# Patient Record
Sex: FEMALE | Race: WHITE | NOT HISPANIC OR LATINO | Employment: STUDENT | URBAN - METROPOLITAN AREA
[De-identification: names, ages, dates, MRNs, and addresses within clinical notes are randomized per-mention and may not be internally consistent; named-entity substitution may affect disease eponyms.]

---
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
## 2023-02-03 ENCOUNTER — OFFICE VISIT (OUTPATIENT)
Dept: FAMILY MEDICINE CLINIC | Facility: CLINIC | Age: 18
End: 2023-02-03
Payer: COMMERCIAL

## 2023-02-03 VITALS
WEIGHT: 115 LBS | OXYGEN SATURATION: 99 % | SYSTOLIC BLOOD PRESSURE: 102 MMHG | DIASTOLIC BLOOD PRESSURE: 68 MMHG | HEIGHT: 66 IN | BODY MASS INDEX: 18.48 KG/M2 | HEART RATE: 86 BPM

## 2023-02-03 DIAGNOSIS — R11.2 NAUSEA AND VOMITING, UNSPECIFIED VOMITING TYPE: Primary | ICD-10-CM

## 2023-02-03 DIAGNOSIS — R63.4 WEIGHT LOSS: ICD-10-CM

## 2023-02-03 DIAGNOSIS — F41.1 GENERALIZED ANXIETY DISORDER: ICD-10-CM

## 2023-02-03 PROCEDURE — 99214 OFFICE O/P EST MOD 30 MIN: CPT | Performed by: NURSE PRACTITIONER

## 2023-02-03 PROCEDURE — 36415 COLL VENOUS BLD VENIPUNCTURE: CPT | Performed by: NURSE PRACTITIONER

## 2023-02-03 RX ORDER — OMEPRAZOLE 20 MG/1
20 CAPSULE, DELAYED RELEASE ORAL DAILY
Qty: 90 CAPSULE | Refills: 0 | Status: SHIPPED | OUTPATIENT
Start: 2023-02-03

## 2023-02-03 RX ORDER — ONDANSETRON HYDROCHLORIDE 8 MG/1
8 TABLET, FILM COATED ORAL EVERY 8 HOURS PRN
Qty: 30 TABLET | Refills: 1 | Status: SHIPPED | OUTPATIENT
Start: 2023-02-03

## 2023-02-03 NOTE — PROGRESS NOTES
"Chief Complaint  stomach issues (Nausea, vomiting. Tried to cut out dairy. Comes and goes. Usually lasts 2 days - 1 week plus. )    Subjective          Karen Mcmahan presents to [facility]  History of Present Illness  Pt has had intermittent episodes of nausea and vomiting for 2 years. She denies diarrhea but has constipation at times. She has tried to cut out dairy without success. She has anxiety at times and feels her GI symptoms are worse during periods of anxiety. She denies blood in her stool or vomit. She has lost 5 pounds with symptoms.       Objective   Vital Signs:   /68   Pulse 86   Ht 167.6 cm (66\")   Wt 52.2 kg (115 lb)   SpO2 99%   BMI 18.56 kg/m²     Body mass index is 18.56 kg/m².    Review of Systems   Constitutional: Negative for fatigue and fever.   Respiratory: Negative for shortness of breath.    Cardiovascular: Negative for chest pain, palpitations and leg swelling.   Gastrointestinal: Positive for nausea and vomiting. Negative for abdominal pain and diarrhea.   Neurological: Negative for syncope.   Psychiatric/Behavioral: The patient is nervous/anxious.           Current Outpatient Medications:   •  omeprazole (priLOSEC) 20 MG capsule, Take 1 capsule by mouth Daily., Disp: 90 capsule, Rfl: 0  •  ondansetron (Zofran) 8 MG tablet, Take 1 tablet by mouth Every 8 (Eight) Hours As Needed for Nausea or Vomiting., Disp: 30 tablet, Rfl: 1      Allergies: Clavulanic acid, Augmentin [amoxicillin-pot clavulanate], and Penicillins    Physical Exam  Constitutional:       Appearance: Normal appearance.   HENT:      Head: Normocephalic.   Eyes:      Conjunctiva/sclera: Conjunctivae normal.      Pupils: Pupils are equal, round, and reactive to light.   Cardiovascular:      Rate and Rhythm: Normal rate and regular rhythm.      Heart sounds: Normal heart sounds.   Pulmonary:      Effort: Pulmonary effort is normal.      Breath sounds: Normal breath sounds.   Abdominal: "      Tenderness: There is no abdominal tenderness.   Musculoskeletal:         General: Normal range of motion.   Skin:     General: Skin is warm and dry.      Capillary Refill: Capillary refill takes less than 2 seconds.   Neurological:      General: No focal deficit present.      Mental Status: She is alert and oriented to person, place, and time.   Psychiatric:         Mood and Affect: Mood normal.         Behavior: Behavior normal.         Thought Content: Thought content normal.         Judgment: Judgment normal.          Result Review :                   Assessment and Plan    Diagnoses and all orders for this visit:    1. Nausea and vomiting, unspecified vomiting type (Primary)  Comments:  Labs drawn. Zofran as needed for nausea. Take Omeprazole daily x 8 weeks. [place] diet and avoid triggers. We will refer to GI if not improving.   Orders:  -     omeprazole (priLOSEC) 20 MG capsule; Take 1 capsule by mouth Daily.  Dispense: 90 capsule; Refill: 0  -     ondansetron (Zofran) 8 MG tablet; Take 1 tablet by mouth Every 8 (Eight) Hours As Needed for Nausea or Vomiting.  Dispense: 30 tablet; Refill: 1  -     CBC & Differential; Future  -     Comprehensive Metabolic Panel; Future  -     Food Allergy Profile; Future  -     Celiac Disease Panel; Future    2. Generalized anxiety disorder  Comments:  We may try an anxiety medication if not improving.     3. Weight loss  -     TSH; Future                Follow Up   No follow-ups on file.  Patient was given instructions and counseling regarding her condition or for health maintenance advice. Please see specific information pulled into the AVS if appropriate.     MICHAEL Webb

## 2023-02-04 LAB
ALBUMIN SERPL-MCNC: 4.7 G/DL (ref 3.9–5)
ALBUMIN/GLOB SERPL: 2 {RATIO} (ref 1.2–2.2)
ALP SERPL-CCNC: 35 IU/L (ref 42–106)
ALT SERPL-CCNC: 13 IU/L (ref 0–32)
AST SERPL-CCNC: 17 IU/L (ref 0–40)
BASOPHILS # BLD AUTO: 0.1 X10E3/UL (ref 0–0.2)
BASOPHILS NFR BLD AUTO: 1 %
BILIRUB SERPL-MCNC: 0.4 MG/DL (ref 0–1.2)
BUN SERPL-MCNC: 18 MG/DL (ref 6–20)
BUN/CREAT SERPL: 18 (ref 9–23)
CALCIUM SERPL-MCNC: 9.3 MG/DL (ref 8.7–10.2)
CHLORIDE SERPL-SCNC: 107 MMOL/L (ref 96–106)
CO2 SERPL-SCNC: 23 MMOL/L (ref 20–29)
CREAT SERPL-MCNC: 1 MG/DL (ref 0.57–1)
EGFRCR SERPLBLD CKD-EPI 2021: 83 ML/MIN/1.73
EOSINOPHIL # BLD AUTO: 0.1 X10E3/UL (ref 0–0.4)
EOSINOPHIL NFR BLD AUTO: 1 %
ERYTHROCYTE [DISTWIDTH] IN BLOOD BY AUTOMATED COUNT: 12.2 % (ref 11.7–15.4)
GLOBULIN SER CALC-MCNC: 2.3 G/DL (ref 1.5–4.5)
GLUCOSE SERPL-MCNC: 86 MG/DL (ref 70–99)
HCT VFR BLD AUTO: 37.8 % (ref 34–46.6)
HGB BLD-MCNC: 12.4 G/DL (ref 11.1–15.9)
IMM GRANULOCYTES # BLD AUTO: 0 X10E3/UL (ref 0–0.1)
IMM GRANULOCYTES NFR BLD AUTO: 0 %
LYMPHOCYTES # BLD AUTO: 1.6 X10E3/UL (ref 0.7–3.1)
LYMPHOCYTES NFR BLD AUTO: 21 %
MCH RBC QN AUTO: 29.2 PG (ref 26.6–33)
MCHC RBC AUTO-ENTMCNC: 32.8 G/DL (ref 31.5–35.7)
MCV RBC AUTO: 89 FL (ref 79–97)
MONOCYTES # BLD AUTO: 0.7 X10E3/UL (ref 0.1–0.9)
MONOCYTES NFR BLD AUTO: 9 %
NEUTROPHILS # BLD AUTO: 5.5 X10E3/UL (ref 1.4–7)
NEUTROPHILS NFR BLD AUTO: 68 %
PLATELET # BLD AUTO: 224 X10E3/UL (ref 150–450)
POTASSIUM SERPL-SCNC: 4.4 MMOL/L (ref 3.5–5.2)
PROT SERPL-MCNC: 7 G/DL (ref 6–8.5)
RBC # BLD AUTO: 4.25 X10E6/UL (ref 3.77–5.28)
SODIUM SERPL-SCNC: 141 MMOL/L (ref 134–144)
TSH SERPL DL<=0.005 MIU/L-ACNC: 1.78 UIU/ML (ref 0.45–4.5)
WBC # BLD AUTO: 8 X10E3/UL (ref 3.4–10.8)

## 2023-02-06 LAB
ENDOMYSIUM IGA SER QL: NEGATIVE
IGA SERPL-MCNC: 165 MG/DL (ref 87–352)
TTG IGA SER-ACNC: <2 U/ML (ref 0–3)

## 2023-02-11 LAB
CLAM IGE QN: <0.1 KU/L
CODFISH IGE QN: <0.1 KU/L
CONV CLASS DESCRIPTION: NORMAL
CORN IGE QN: <0.1 KU/L
COW MILK IGE QN: <0.1 KU/L
EGG WHITE IGE QN: <0.1 KU/L
PEANUT IGE QN: <0.1 KU/L
SCALLOP IGE QN: <0.1 KU/L
SESAME SEED IGE QN: <0.1 KU/L
SHRIMP IGE QN: <0.1 KU/L
SOYBEAN IGE QN: <0.1 KU/L
WALNUT IGE QN: <0.1 KU/L
WHEAT IGE QN: <0.1 KU/L

## 2023-05-03 DIAGNOSIS — R11.2 NAUSEA AND VOMITING, UNSPECIFIED VOMITING TYPE: ICD-10-CM

## 2023-05-04 RX ORDER — OMEPRAZOLE 20 MG/1
CAPSULE, DELAYED RELEASE ORAL
Qty: 90 CAPSULE | Refills: 0 | Status: SHIPPED | OUTPATIENT
Start: 2023-05-04

## 2023-07-25 ENCOUNTER — OFFICE VISIT (OUTPATIENT)
Dept: FAMILY MEDICINE CLINIC | Facility: CLINIC | Age: 18
End: 2023-07-25
Payer: COMMERCIAL

## 2023-07-25 VITALS
OXYGEN SATURATION: 99 % | HEIGHT: 66 IN | WEIGHT: 115 LBS | SYSTOLIC BLOOD PRESSURE: 96 MMHG | DIASTOLIC BLOOD PRESSURE: 64 MMHG | BODY MASS INDEX: 18.48 KG/M2 | HEART RATE: 70 BPM

## 2023-07-25 DIAGNOSIS — R14.0 ABDOMINAL BLOATING: ICD-10-CM

## 2023-07-25 DIAGNOSIS — Z13.31 SCREENING FOR DEPRESSION: ICD-10-CM

## 2023-07-25 DIAGNOSIS — N94.6 DYSMENORRHEA: ICD-10-CM

## 2023-07-25 DIAGNOSIS — Z00.00 WELL ADULT EXAM: Primary | ICD-10-CM

## 2023-07-25 PROBLEM — R10.31 RIGHT LOWER QUADRANT PAIN: Status: RESOLVED | Noted: 2022-09-29 | Resolved: 2023-07-25

## 2023-07-25 PROBLEM — Z13.220 SCREENING FOR CHOLESTEROL LEVEL: Status: RESOLVED | Noted: 2022-07-25 | Resolved: 2023-07-25

## 2023-07-25 PROBLEM — Z00.129 ENCOUNTER FOR ROUTINE CHILD HEALTH EXAMINATION WITHOUT ABNORMAL FINDINGS: Status: RESOLVED | Noted: 2022-07-25 | Resolved: 2023-07-25

## 2023-07-25 PROCEDURE — 96127 BRIEF EMOTIONAL/BEHAV ASSMT: CPT | Performed by: PEDIATRICS

## 2023-07-25 PROCEDURE — 90471 IMMUNIZATION ADMIN: CPT | Performed by: PEDIATRICS

## 2023-07-25 PROCEDURE — 90620 MENB-4C VACCINE IM: CPT | Performed by: PEDIATRICS

## 2023-07-25 PROCEDURE — 99395 PREV VISIT EST AGE 18-39: CPT | Performed by: PEDIATRICS

## 2023-07-25 NOTE — ASSESSMENT & PLAN NOTE
Routine guidance discussed with Karen and safety issues addressed.  Will give Bexsero today and VIS given.  Next well exam in 1 year.

## 2023-07-25 NOTE — ASSESSMENT & PLAN NOTE
Continue to monitor monthly cycles.  She states they are currently manageable with Tylenol.  Discussed if worsening may return to discuss further options.

## 2023-07-25 NOTE — ASSESSMENT & PLAN NOTE
Chepe with Karen I think a lot of her abdominal discomfort could be secondary to constipation or possibly due to vacationing this year and not eating as healthy.  We discussed changing her diet as well as starting a probiotic and samples given.  If not improving to return for abdominal x-ray.

## 2023-07-25 NOTE — PROGRESS NOTES
"Chief Complaint  Well Child (Pt is here alone for her physical )    Subjective          History of Present Illness  Karen Mcmahan is here today concerns of a well exam.  She has recently graduated from high school and will be starting [employer] this fall and is wanting to go into the cardiac sonography school.  She states she has vacation 3-4 times this year and states she did not eat as healthy on vacation.  She is recently ha some issues of feeling bloated after eating.  She does have a history of constipation.  She states she is stooling every 2 to 3 days.  No urinary complaints.  She is sleeping well.  She states she does have regular menses however she has had more menstrual cramps with these.  She states she does feel like Tylenol does work better for her and has been doing well with this.  She has never been sexually active.    Objective   Vital Signs:   BP 96/64   Pulse 70   Ht 168.3 cm (66.25\")   Wt 52.2 kg (115 lb)   SpO2 99%   BMI 18.42 kg/m²     Body mass index is 18.42 kg/m².      Review of Systems   Constitutional:  Negative for chills and fever.   HENT:  Negative for ear pain, rhinorrhea and sneezing.    Eyes:  Negative for discharge and redness.   Respiratory:  Negative for cough.    Gastrointestinal:  Negative for diarrhea and vomiting.   Skin:  Negative for rash.     No current outpatient medications on file.    Allergies: Clavulanic acid, Augmentin [amoxicillin-pot clavulanate], and Penicillins    Physical Exam  Constitutional:       Appearance: Normal appearance.   HENT:      Head: Normocephalic.      Right Ear: Tympanic membrane, ear canal and external ear normal.      Left Ear: Tympanic membrane, ear canal and external ear normal.      Nose: Nose normal.      Mouth/Throat:      Mouth: Mucous membranes are moist.      Pharynx: Oropharynx is clear.   Eyes:      Conjunctiva/sclera: Conjunctivae normal.      Pupils: Pupils are equal, round, and reactive to light.   Cardiovascular:      Rate and " Rhythm: Normal rate and regular rhythm.      Pulses: Normal pulses.      Heart sounds: Normal heart sounds.   Pulmonary:      Effort: Pulmonary effort is normal.      Breath sounds: Normal breath sounds.   Abdominal:      General: Abdomen is flat.      Palpations: Abdomen is soft.   Musculoskeletal:         General: Normal range of motion.      Cervical back: Normal range of motion and neck supple.   Skin:     General: Skin is warm.      Capillary Refill: Capillary refill takes less than 2 seconds.   Neurological:      General: No focal deficit present.      Mental Status: She is alert.   Psychiatric:         Mood and Affect: Mood normal.         Behavior: Behavior normal.        Result Review :                   Assessment and Plan    Diagnoses and all orders for this visit:    1. Well adult exam (Primary)  Assessment & Plan:  Routine guidance discussed with Karen and safety issues addressed.  Will give Bexsero today and VIS given.  Next well exam in 1 year.    Orders:  -     Bexsero    2. Screening for depression  Assessment & Plan:  PHQ-9 score of 1.      3. Dysmenorrhea  Assessment & Plan:  Continue to monitor monthly cycles.  She states they are currently manageable with Tylenol.  Discussed if worsening may return to discuss further options.      4. Abdominal bloating  Assessment & Plan:  Chepe with Karen I think a lot of her abdominal discomfort could be secondary to constipation or possibly due to vacationing this year and not eating as healthy.  We discussed changing her diet as well as starting a probiotic and samples given.  If not improving to return for abdominal x-ray.          Follow Up   Return in about 1 year (around 7/25/2024) for Well exam.  Patient was given instructions and counseling regarding her condition or for health maintenance advice. Please see specific information pulled into the AVS if appropriate.     Bijan Peralta MD  07/25/2023

## 2023-07-25 NOTE — LETTER
"VACCINE CONSENT FORM      Patient Name:  Karen Mcmahan    Patient :  2005      I/We have read, or have been explained, the information about the diseases and the vaccines listed below.  There was an opportunity to ask questions and any questions were answered satisfactorily.  I/We believe that I/we understand the benefits and risks of the vaccines(s) cited, and ask the vaccine(s) listed below be given to me/us or the person named above (for which i have authorized to make the request).      Vaccine(s) give:    Orders Placed This Encounter   Procedures   • Bexsero         Medicare patients:    The only vaccine covered under your medical benefit is flu/pneumonia and hepatitis B.  All other may be covered under your \"Part D\" prescription plan and requires you to go to a pharmacy with a Physician orders for administration.  If you still prefer to have it administered at our office, you will receive a bill for the vaccine and administration cost.               Patient Initials                     Patient or Parent/Guardian Signature                    Date        A copy of the appropriate Centers for Disease Control and Prevention Vaccine Information Statements has been provided.   "

## 2023-11-01 ENCOUNTER — FLU SHOT (OUTPATIENT)
Dept: FAMILY MEDICINE CLINIC | Facility: CLINIC | Age: 18
End: 2023-11-01

## 2023-11-01 DIAGNOSIS — Z23 IMMUNIZATION DUE: Primary | ICD-10-CM

## 2023-11-03 ENCOUNTER — OFFICE VISIT (OUTPATIENT)
Dept: FAMILY MEDICINE CLINIC | Facility: CLINIC | Age: 18
End: 2023-11-03

## 2023-11-03 VITALS
SYSTOLIC BLOOD PRESSURE: 102 MMHG | BODY MASS INDEX: 18.16 KG/M2 | DIASTOLIC BLOOD PRESSURE: 80 MMHG | WEIGHT: 113 LBS | HEIGHT: 66 IN | HEART RATE: 81 BPM

## 2023-11-03 DIAGNOSIS — Z11.1 SCREENING-PULMONARY TB: Primary | ICD-10-CM

## 2023-11-06 LAB
INDURATION: 0 MM (ref 0–10)
Lab: NORMAL
Lab: NORMAL
TB SKIN TEST: NEGATIVE

## 2023-11-10 ENCOUNTER — CLINICAL SUPPORT (OUTPATIENT)
Dept: FAMILY MEDICINE CLINIC | Facility: CLINIC | Age: 18
End: 2023-11-10

## 2023-11-10 DIAGNOSIS — Z11.1 PPD SCREENING TEST: Primary | ICD-10-CM

## 2023-11-13 LAB
INDURATION: 0 MM (ref 0–10)
Lab: NORMAL
Lab: NORMAL
TB SKIN TEST: NEGATIVE

## 2023-11-14 NOTE — ASSESSMENT & PLAN NOTE
PPD placed today.  Discussed she will need to have this read in 48 to 72 hours.  We discussed a two-step TB skin test requires another skin test in 7 to 21 days.  That will also have to be read in 48 to 72 hours after given.

## 2023-11-14 NOTE — PROGRESS NOTES
"Chief Complaint  TB Test    Subjective          History of Present Illness  Karen Mcmahan is here today for concerns of needing a two-step TB skin test.  She states she is needing this for school.  She has had no recent weight loss, night sweats or chronic cough.  She has had no recent exposures to tuberculosis or anyone incarcerated or recently immigrated.  She has never had a skin test in the past.    Objective   Vital Signs:   /80   Pulse 81   Ht 167.6 cm (66\")   Wt 51.3 kg (113 lb)   BMI 18.24 kg/m²     Body mass index is 18.24 kg/m².      Review of Systems   Constitutional:  Negative for chills and fever.   HENT:  Negative for ear pain, rhinorrhea and sneezing.    Eyes:  Negative for discharge and redness.   Respiratory:  Negative for cough.    Gastrointestinal:  Negative for diarrhea and vomiting.   Skin:  Negative for rash.       No current outpatient medications on file.    Allergies: Clavulanic acid, Augmentin [amoxicillin-pot clavulanate], and Penicillins    Physical Exam  Constitutional:       Appearance: Normal appearance.   HENT:      Right Ear: Tympanic membrane, ear canal and external ear normal.      Left Ear: Tympanic membrane, ear canal and external ear normal.      Mouth/Throat:      Mouth: Mucous membranes are moist.      Pharynx: Oropharynx is clear.   Eyes:      Conjunctiva/sclera: Conjunctivae normal.   Cardiovascular:      Rate and Rhythm: Normal rate and regular rhythm.   Pulmonary:      Effort: Pulmonary effort is normal.      Breath sounds: Normal breath sounds.   Abdominal:      Palpations: Abdomen is soft.   Skin:     Capillary Refill: Capillary refill takes less than 2 seconds.   Neurological:      Mental Status: She is alert.          Result Review :                   Assessment and Plan    Diagnoses and all orders for this visit:    1. Screening-pulmonary TB (Primary)  Assessment & Plan:  PPD placed today.  Discussed she will need to have this read in 48 to 72 hours.  We " discussed a two-step TB skin test requires another skin test in 7 to 21 days.  That will also have to be read in 48 to 72 hours after given.    Orders:  -     Cancel: QuantiFERON TB Gold  -     TB Skin Test  -     TB Skin Test; Future        Follow Up   Return if symptoms worsen or fail to improve.  Patient was given instructions and counseling regarding her condition or for health maintenance advice. Please see specific information pulled into the AVS if appropriate.     Bijan Peralta MD  11/03/2023    Answers submitted by the patient for this visit:  Other (Submitted on 11/2/2023)  Please describe your symptoms.: N/A  Have you had these symptoms before?: No  How long have you been having these symptoms?: 1-4 days  Primary Reason for Visit (Submitted on 11/2/2023)  What is the primary reason for your visit?: Other

## 2023-11-15 ENCOUNTER — OFFICE VISIT (OUTPATIENT)
Dept: FAMILY MEDICINE CLINIC | Facility: CLINIC | Age: 18
End: 2023-11-15
Payer: MEDICAID

## 2023-11-15 VITALS
TEMPERATURE: 98.6 F | DIASTOLIC BLOOD PRESSURE: 60 MMHG | WEIGHT: 111.38 LBS | HEART RATE: 99 BPM | SYSTOLIC BLOOD PRESSURE: 100 MMHG | HEIGHT: 66 IN | BODY MASS INDEX: 17.9 KG/M2 | OXYGEN SATURATION: 98 %

## 2023-11-15 DIAGNOSIS — J06.9 ACUTE URI: Primary | ICD-10-CM

## 2023-11-15 LAB
EXPIRATION DATE: NORMAL
FLUAV AG NPH QL: NEGATIVE
FLUBV AG NPH QL: NEGATIVE
INTERNAL CONTROL: NORMAL
Lab: NORMAL

## 2023-11-15 PROCEDURE — 99213 OFFICE O/P EST LOW 20 MIN: CPT | Performed by: NURSE PRACTITIONER

## 2023-11-15 RX ORDER — BROMPHENIRAMINE MALEATE, PSEUDOEPHEDRINE HYDROCHLORIDE, AND DEXTROMETHORPHAN HYDROBROMIDE 2; 30; 10 MG/5ML; MG/5ML; MG/5ML
5 SYRUP ORAL 4 TIMES DAILY PRN
Qty: 120 ML | Refills: 0 | Status: SHIPPED | OUTPATIENT
Start: 2023-11-15

## 2023-11-15 NOTE — PROGRESS NOTES
"Chief Complaint  Nasal Congestion (cough)    Subjective          Karen Mcmahan presents to [facility]  History of Present Illness    Patient states for the past 2 days she has had cough and congestion.  States the first day she had a sore throat and body aches but states those have resolved.  No fever no chills.  No sick contacts that she is aware of.  No GI issues.  She states she took an at home COVID test yesterday and it was negative.  States she does not have insurance so she wants to limit herself on the swabs we do.  She is agreeable to a flu swab today though.  No dizziness no headache no chest pain no chest pressure no shortness of breath no trouble breathing.    Objective   Vital Signs:   /60   Pulse 99   Temp 98.6 °F (37 °C)   Ht 167.6 cm (66\")   Wt 50.5 kg (111 lb 6 oz)   SpO2 98%   BMI 17.98 kg/m²     Body mass index is 17.98 kg/m².    Review of Systems   Constitutional:  Positive for chills. Negative for fatigue and fever.   HENT:  Positive for congestion and rhinorrhea. Negative for ear discharge, ear pain, sinus pressure, sneezing, sore throat and trouble swallowing.    Respiratory:  Positive for cough. Negative for shortness of breath and wheezing.    Cardiovascular:  Negative for chest pain.   Gastrointestinal:  Negative for abdominal pain, diarrhea, nausea and vomiting.   Genitourinary:  Negative for decreased urine volume, dysuria, frequency, hematuria and urgency.   Musculoskeletal:  Negative for back pain.   Skin:  Negative for rash.   Neurological:  Negative for headache.       Past History:  Medical History: has a past medical history of Acute sinusitis, Anemia (????), Anxiety (2013), Depression (2013), PMS (premenstrual syndrome) (10), and Seizures (2013).   Surgical History: has no past surgical history on file.   Family History: family history includes Anxiety disorder in her mother; Diabetes in her father and maternal grandmother; Heart attack in " her maternal grandfather; Hypertension in her father, maternal aunt, maternal grandfather, maternal grandmother, and mother; Melanoma in her maternal grandfather; No Known Problems in her paternal grandfather and paternal grandmother; Osteoporosis in her maternal grandmother.   Social History: reports that she has never smoked. She has never used smokeless tobacco. She reports that she does not drink alcohol and does not use drugs.    PHQ-2 Depression Screening  Little interest or pleasure in doing things?     Feeling down, depressed, or hopeless?     PHQ-2 Total Score          PHQ-9 Depression Screening  Little interest or pleasure in doing things?     Feeling down, depressed, or hopeless?     Trouble falling or staying asleep, or sleeping too much?     Feeling tired or having little energy?     Poor appetite or overeating?     Feeling bad about yourself - or that you are a failure or have let yourself or your family down?     Trouble concentrating on things, such as reading the newspaper or watching television?     Moving or speaking so slowly that other people could have noticed? Or the opposite - being so fidgety or restless that you have been moving around a lot more than usual?     Thoughts that you would be better off dead, or of hurting yourself in some way?     PHQ-9 Total Score     If you checked off any problems, how difficult have these problems made it for you to do your work, take care of things at home, or get along with other people?       PHQ-9 Total Score:        Patient screened positive for depression based on a PHQ-9 score of 1 on 7/25/2023. Follow-up recommendations include:          Current Outpatient Medications:     brompheniramine-pseudoephedrine-DM 30-2-10 MG/5ML syrup, Take 5 mL by mouth 4 (Four) Times a Day As Needed for Cough or Congestion., Disp: 120 mL, Rfl: 0   (Not in a hospital admission)     Allergies: Clavulanic acid, Augmentin [amoxicillin-pot clavulanate], and  Penicillins    Physical Exam  Constitutional:       Appearance: Normal appearance.   HENT:      Right Ear: Tympanic membrane, ear canal and external ear normal.      Left Ear: Tympanic membrane, ear canal and external ear normal.      Nose: Nose normal.      Mouth/Throat:      Mouth: Mucous membranes are moist.      Pharynx: Oropharynx is clear.   Cardiovascular:      Rate and Rhythm: Normal rate and regular rhythm.      Heart sounds: Normal heart sounds.   Pulmonary:      Effort: Pulmonary effort is normal.      Breath sounds: Normal breath sounds.   Neurological:      General: No focal deficit present.      Mental Status: She is alert and oriented to person, place, and time. Mental status is at baseline.   Psychiatric:         Mood and Affect: Mood normal.         Behavior: Behavior normal.         Thought Content: Thought content normal.         Judgment: Judgment normal.          Result Review :                   Assessment and Plan    Diagnoses and all orders for this visit:    1. Acute URI (Primary)  Assessment & Plan:  Flu negative.  Patient denies COVID testing due to lack of insurance.  Viral versus bacterial discussed and likely viral.  Tylenol and ibuprofen as needed for pain or fever.  Continue with allergy meds.  Bromfed, monitor for sedation, as needed for cough and congestion.  Fluids and rest encouraged.  Risk of meds discussed and understood.  Return in 5 days, sooner if wrosens.  Return to clinic or ED with any issues or concerns.    Orders:  -     brompheniramine-pseudoephedrine-DM 30-2-10 MG/5ML syrup; Take 5 mL by mouth 4 (Four) Times a Day As Needed for Cough or Congestion.  Dispense: 120 mL; Refill: 0  -     POC Influenza A / B; Future                  Follow Up   Return if symptoms worsen or fail to improve.  Patient was given instructions and counseling regarding her condition or for health maintenance advice. Please see specific information pulled into the AVS if appropriate.     Daniel  Dre, APRN

## 2023-11-15 NOTE — ASSESSMENT & PLAN NOTE
Flu negative.  Patient denies COVID testing due to lack of insurance.  Viral versus bacterial discussed and likely viral.  Tylenol and ibuprofen as needed for pain or fever.  Continue with allergy meds.  Bromfed, monitor for sedation, as needed for cough and congestion.  Fluids and rest encouraged.  Risk of meds discussed and understood.  Return in 5 days, sooner if wrosens.  Return to clinic or ED with any issues or concerns.

## 2024-01-29 ENCOUNTER — OFFICE VISIT (OUTPATIENT)
Dept: FAMILY MEDICINE CLINIC | Facility: CLINIC | Age: 19
End: 2024-01-29
Payer: COMMERCIAL

## 2024-01-29 VITALS
DIASTOLIC BLOOD PRESSURE: 82 MMHG | HEART RATE: 104 BPM | OXYGEN SATURATION: 98 % | BODY MASS INDEX: 17.42 KG/M2 | SYSTOLIC BLOOD PRESSURE: 110 MMHG | WEIGHT: 111 LBS | HEIGHT: 67 IN

## 2024-01-29 DIAGNOSIS — R59.0 ENLARGED LYMPH NODE IN NECK: ICD-10-CM

## 2024-01-29 DIAGNOSIS — R53.83 OTHER FATIGUE: Primary | ICD-10-CM

## 2024-01-29 DIAGNOSIS — E61.1 IRON DEFICIENCY: ICD-10-CM

## 2024-01-29 DIAGNOSIS — R63.0 APPETITE LOSS: ICD-10-CM

## 2024-01-29 PROCEDURE — 99214 OFFICE O/P EST MOD 30 MIN: CPT | Performed by: NURSE PRACTITIONER

## 2024-01-29 NOTE — PROGRESS NOTES
"Chief Complaint  stomach issues, Headache, Fatigue (States that she used to be anemic, wants checked. Also has had loss of appetite), and lump on neck (Noticed lump 01/23/24. Doesn't know how long it has been there before she noticed it )    Subjective          Karen Mcmahan presents to [facility]  History of Present Illness  Pt has had fatigue, headaches, decreased appetite, and an enlarged lymph node on her right neck for the past 10 days. She denies fever, chills, or myalgias. She denies cough, congestion, sore throat, or abdominal pain. She states she has been low on iron in the past and had similar sx. She has had chronic GI concerns with bloating and intermittent appetite loss.   Headache  Fatigue  Associated symptoms include fatigue and headaches. Pertinent negatives include no chest pain or fever.       Objective   Vital Signs:   /82   Pulse 104   Ht 168.9 cm (66.5\")   Wt 50.3 kg (111 lb)   SpO2 98%   BMI 17.65 kg/m²     Body mass index is 17.65 kg/m².    Review of Systems   Constitutional:  Positive for appetite change and fatigue. Negative for fever.   Respiratory:  Negative for shortness of breath.    Cardiovascular:  Negative for chest pain, palpitations and leg swelling.   Neurological:  Negative for syncope.   Psychiatric/Behavioral:  The patient is not nervous/anxious.         No current outpatient medications on file.      Allergies: Clavulanic acid, Augmentin [amoxicillin-pot clavulanate], and Penicillins    Physical Exam  Constitutional:       Appearance: Normal appearance.   HENT:      Right Ear: Tympanic membrane and ear canal normal.      Left Ear: Tympanic membrane and ear canal normal.      Nose: Nose normal.      Mouth/Throat:      Pharynx: No posterior oropharyngeal erythema.   Eyes:      Extraocular Movements: Extraocular movements intact.      Conjunctiva/sclera: Conjunctivae normal.      Pupils: Pupils are equal, round, and reactive to light. "   Cardiovascular:      Rate and Rhythm: Normal rate and regular rhythm.      Heart sounds: Normal heart sounds.   Pulmonary:      Effort: Pulmonary effort is normal. No accessory muscle usage.      Breath sounds: Normal breath sounds.   Lymphadenopathy:      Cervical: No cervical adenopathy.   Skin:     General: Skin is warm and dry.   Neurological:      General: No focal deficit present.      Mental Status: She is alert.   Psychiatric:         Mood and Affect: Mood normal.         Behavior: Behavior normal.         Thought Content: Thought content normal.         Judgment: Judgment normal.          Result Review :                   Assessment and Plan    Diagnoses and all orders for this visit:    1. Other fatigue (Primary)  Comments:  Labs drawn. RTC for worsened sx and if not improving in 1 week.  Orders:  -     Cancel: CBC & Differential  -     Cancel: Comprehensive Metabolic Panel  -     Cancel: TSH  -     Cancel: EBVCA(IgG / M)  -     Cancel: CMV IgG IgM  -     CBC & Differential  -     Comprehensive Metabolic Panel  -     TSH  -     CMV IgG IgM  -     EBVCA(IgG / M)    2. Iron deficiency  Comments:  Labs drawn.  Orders:  -     Cancel: Ferritin  -     Cancel: Iron and TIBC; Future  -     Ferritin  -     Iron and TIBC    3. Enlarged lymph node in neck  Comments:  Call if not improving in 1 week. We will refer to ENT if not improving.    4. Appetite loss  Comments:  I offered GI referral but she declined for now. We may refer to GI if not improving.                Follow Up   Return in about 1 week (around 2/5/2024) for if not improving or sooner if symptoms worsen.  Patient was given instructions and counseling regarding her condition or for health maintenance advice. Please see specific information pulled into the AVS if appropriate.     Key Foster, APRN

## 2024-01-30 LAB
ALBUMIN SERPL-MCNC: 5.2 G/DL (ref 4–5)
ALBUMIN/GLOB SERPL: 2.4 {RATIO} (ref 1.2–2.2)
ALP SERPL-CCNC: 36 IU/L (ref 42–106)
ALT SERPL-CCNC: 14 IU/L (ref 0–32)
AST SERPL-CCNC: 16 IU/L (ref 0–40)
BASOPHILS # BLD AUTO: 0 X10E3/UL (ref 0–0.2)
BASOPHILS NFR BLD AUTO: 1 %
BILIRUB SERPL-MCNC: 0.8 MG/DL (ref 0–1.2)
BUN SERPL-MCNC: 11 MG/DL (ref 6–20)
BUN/CREAT SERPL: 13 (ref 9–23)
CALCIUM SERPL-MCNC: 10 MG/DL (ref 8.7–10.2)
CHLORIDE SERPL-SCNC: 105 MMOL/L (ref 96–106)
CMV IGG SERPL IA-ACNC: <0.6 U/ML (ref 0–0.59)
CMV IGM SERPL IA-ACNC: <30 AU/ML (ref 0–29.9)
CO2 SERPL-SCNC: 20 MMOL/L (ref 20–29)
CREAT SERPL-MCNC: 0.88 MG/DL (ref 0.57–1)
EBV VCA IGG SER IA-ACNC: >600 U/ML (ref 0–17.9)
EBV VCA IGM SER IA-ACNC: <36 U/ML (ref 0–35.9)
EGFRCR SERPLBLD CKD-EPI 2021: 96 ML/MIN/1.73
EOSINOPHIL # BLD AUTO: 0.1 X10E3/UL (ref 0–0.4)
EOSINOPHIL NFR BLD AUTO: 1 %
ERYTHROCYTE [DISTWIDTH] IN BLOOD BY AUTOMATED COUNT: 12 % (ref 11.7–15.4)
FERRITIN SERPL-MCNC: 80 NG/ML (ref 15–150)
GLOBULIN SER CALC-MCNC: 2.2 G/DL (ref 1.5–4.5)
GLUCOSE SERPL-MCNC: 85 MG/DL (ref 70–99)
HCT VFR BLD AUTO: 39.8 % (ref 34–46.6)
HGB BLD-MCNC: 13.3 G/DL (ref 11.1–15.9)
IMM GRANULOCYTES # BLD AUTO: 0 X10E3/UL (ref 0–0.1)
IMM GRANULOCYTES NFR BLD AUTO: 0 %
IRON SATN MFR SERPL: 42 % (ref 15–55)
IRON SERPL-MCNC: 126 UG/DL (ref 27–159)
LYMPHOCYTES # BLD AUTO: 1.4 X10E3/UL (ref 0.7–3.1)
LYMPHOCYTES NFR BLD AUTO: 28 %
MCH RBC QN AUTO: 29.2 PG (ref 26.6–33)
MCHC RBC AUTO-ENTMCNC: 33.4 G/DL (ref 31.5–35.7)
MCV RBC AUTO: 88 FL (ref 79–97)
MONOCYTES # BLD AUTO: 0.5 X10E3/UL (ref 0.1–0.9)
MONOCYTES NFR BLD AUTO: 9 %
NEUTROPHILS # BLD AUTO: 3.1 X10E3/UL (ref 1.4–7)
NEUTROPHILS NFR BLD AUTO: 61 %
PLATELET # BLD AUTO: 256 X10E3/UL (ref 150–450)
POTASSIUM SERPL-SCNC: 4.3 MMOL/L (ref 3.5–5.2)
PROT SERPL-MCNC: 7.4 G/DL (ref 6–8.5)
RBC # BLD AUTO: 4.55 X10E6/UL (ref 3.77–5.28)
SODIUM SERPL-SCNC: 142 MMOL/L (ref 134–144)
TIBC SERPL-MCNC: 299 UG/DL (ref 250–450)
TSH SERPL DL<=0.005 MIU/L-ACNC: 2.6 UIU/ML (ref 0.45–4.5)
UIBC SERPL-MCNC: 173 UG/DL (ref 131–425)
WBC # BLD AUTO: 5.1 X10E3/UL (ref 3.4–10.8)

## 2024-01-31 NOTE — PROGRESS NOTES
Your mono test looks like you've had mono in the past but not recently. Your iron levels were normal. Your CMV test was normal (another virus test). Your blood counts, thyroid test, and electrolyte tests were normal. Let me know if you are not feeling better soon. Take care!

## 2024-02-05 ENCOUNTER — PATIENT MESSAGE (OUTPATIENT)
Dept: FAMILY MEDICINE CLINIC | Facility: CLINIC | Age: 19
End: 2024-02-05

## 2024-08-12 ENCOUNTER — OFFICE VISIT (OUTPATIENT)
Dept: FAMILY MEDICINE CLINIC | Facility: CLINIC | Age: 19
End: 2024-08-12
Payer: COMMERCIAL

## 2024-08-12 VITALS
HEART RATE: 73 BPM | BODY MASS INDEX: 18.32 KG/M2 | HEIGHT: 66 IN | SYSTOLIC BLOOD PRESSURE: 100 MMHG | WEIGHT: 114 LBS | DIASTOLIC BLOOD PRESSURE: 60 MMHG

## 2024-08-12 DIAGNOSIS — Z00.00 WELL ADULT EXAM: Primary | ICD-10-CM

## 2024-08-12 DIAGNOSIS — Z13.31 SCREENING FOR DEPRESSION: ICD-10-CM

## 2024-08-12 PROCEDURE — 99395 PREV VISIT EST AGE 18-39: CPT | Performed by: PEDIATRICS

## 2024-08-12 PROCEDURE — 96127 BRIEF EMOTIONAL/BEHAV ASSMT: CPT | Performed by: PEDIATRICS

## 2024-08-12 NOTE — ASSESSMENT & PLAN NOTE
Routine guidance discussed with Karen and safety issues addressed.  No immunizations given today.  Next well exam in 1 year.

## 2024-08-12 NOTE — PROGRESS NOTES
"Chief Complaint  Well Child    Subjective          History of Present Illness  Karen Mcmahan is here today for concerns of a well exam.  She will be taking classes this fall at [employer] and is wanting to go into cardiac sonography.  She states she will apply for that program next spring.  She states she is eating well and a good variety of foods.  She does continue to have constipation issues.  She states she has been taking more over-the-counter stool softeners.  She does drink plenty of water.  No urinary complaints.  She is sleeping well.  No syncope, presyncope, chest pain or palpitations.  No joint pains.  No alcohol, tobacco or drug use.  She has never been sexually active.  She states she has normal, regular menses.  She states she has pain on the first day of her menses however, has been manageable.    Objective   Vital Signs:   /60   Pulse 73   Ht 167.6 cm (66\")   Wt 51.7 kg (114 lb)   BMI 18.40 kg/m²     Body mass index is 18.4 kg/m².      Review of Systems   Constitutional:  Negative for chills and fever.   HENT:  Negative for ear pain, rhinorrhea and sneezing.    Eyes:  Negative for discharge and redness.   Respiratory:  Negative for cough.    Gastrointestinal:  Negative for diarrhea and vomiting.   Skin:  Negative for rash.       No current outpatient medications on file.    Allergies: Clavulanic acid, Augmentin [amoxicillin-pot clavulanate], and Penicillins    Physical Exam  Constitutional:       Appearance: Normal appearance.   HENT:      Head: Normocephalic.      Right Ear: Tympanic membrane, ear canal and external ear normal.      Left Ear: Tympanic membrane, ear canal and external ear normal.      Nose: Nose normal.      Mouth/Throat:      Mouth: Mucous membranes are moist.      Pharynx: Oropharynx is clear.   Eyes:      Conjunctiva/sclera: Conjunctivae normal.      Pupils: Pupils are equal, round, and reactive to light.   Cardiovascular:      Rate and Rhythm: Normal rate and regular rhythm. "      Pulses: Normal pulses.      Heart sounds: Normal heart sounds.   Pulmonary:      Effort: Pulmonary effort is normal.      Breath sounds: Normal breath sounds.   Abdominal:      General: Abdomen is flat.      Palpations: Abdomen is soft.   Musculoskeletal:         General: Normal range of motion.      Cervical back: Normal range of motion and neck supple.   Skin:     General: Skin is warm.      Capillary Refill: Capillary refill takes less than 2 seconds.   Neurological:      General: No focal deficit present.      Mental Status: She is alert.   Psychiatric:         Mood and Affect: Mood normal.         Behavior: Behavior normal.          Result Review :              PHQ-9 Depression Screening  Little interest or pleasure in doing things? 0-->not at all   Feeling down, depressed, or hopeless? 0-->not at all   Trouble falling or staying asleep, or sleeping too much? 0-->not at all   Feeling tired or having little energy? 1-->several days   Poor appetite or overeating? 0-->not at all   Feeling bad about yourself - or that you are a failure or have let yourself or your family down? 0-->not at all   Trouble concentrating on things, such as reading the newspaper or watching television? 0-->not at all   Moving or speaking so slowly that other people could have noticed? Or the opposite - being so fidgety or restless that you have been moving around a lot more than usual? 0-->not at all   Thoughts that you would be better off dead, or of hurting yourself in some way? 0-->not at all   PHQ-9 Total Score 1   If you checked off any problems, how difficult have these problems made it for you to do your work, take care of things at home, or get along with other people? not difficult at all          Assessment and Plan    Diagnoses and all orders for this visit:    1. Well adult exam (Primary)  Assessment & Plan:  Routine guidance discussed with Karen and safety issues addressed.  No immunizations given today.  Next well exam  in 1 year.      2. Screening for depression  Assessment & Plan:  PHQ-9 score of 1.          Follow Up   Return in about 1 year (around 8/12/2025) for Well exam.  Patient was given instructions and counseling regarding her condition or for health maintenance advice. Please see specific information pulled into the AVS if appropriate.     Bijan Peralta MD  08/12/2024

## 2024-08-20 ENCOUNTER — OFFICE VISIT (OUTPATIENT)
Dept: FAMILY MEDICINE CLINIC | Facility: CLINIC | Age: 19
End: 2024-08-20
Payer: COMMERCIAL

## 2024-08-20 VITALS
DIASTOLIC BLOOD PRESSURE: 80 MMHG | BODY MASS INDEX: 18.32 KG/M2 | HEIGHT: 66 IN | HEART RATE: 92 BPM | WEIGHT: 114 LBS | SYSTOLIC BLOOD PRESSURE: 120 MMHG

## 2024-08-20 DIAGNOSIS — R10.84 GENERALIZED ABDOMINAL PAIN: Primary | ICD-10-CM

## 2024-08-20 PROCEDURE — 99214 OFFICE O/P EST MOD 30 MIN: CPT | Performed by: PEDIATRICS

## 2024-08-20 NOTE — PROGRESS NOTES
"Chief Complaint  Abdominal Pain    Subjective          History of Present Illness  Karen Mcmahan is here today by herself.   History of Present Illness  The patient presents for evaluation of abdominal cramping.    A few days ago, she experienced severe abdominal cramping and bloating, which was a new symptom for her. The pain was so intense that it brought her to tears and lasted for about 20 minutes before subsiding.  She states the pain started after eating.  Yesterday, she experienced mild abdominal pain for about 5 minutes.    She has a history of constipation and occasionally experiences severe gas pains due to constipation. Over the past week and a half, she has been dealing with daily bowel movements. She takes MiraLAX nightly to manage her constipation.    She reports no fever, nausea, or painful urination.    Her menstrual cycle was unusually painful this time, accompanied by significant clotting.  She has never been sexually active.    FAMILY HISTORY  She thinks her aunt had some ovary issues.    Objective   Vital Signs:   /80   Pulse 92   Ht 167.6 cm (66\")   Wt 51.7 kg (114 lb)   BMI 18.40 kg/m²     Body mass index is 18.4 kg/m².      Review of Systems   Constitutional:  Negative for chills and fever.   HENT:  Negative for ear pain, rhinorrhea and sneezing.    Eyes:  Negative for discharge and redness.   Respiratory:  Negative for cough.    Gastrointestinal:  Positive for abdominal pain and constipation. Negative for diarrhea and vomiting.   Genitourinary:  Positive for menstrual problem. Negative for difficulty urinating, dysuria, hematuria and urgency.   Skin:  Negative for rash.       No current outpatient medications on file.    Allergies: Clavulanic acid, Augmentin [amoxicillin-pot clavulanate], and Penicillins    Physical Exam  Constitutional:       Appearance: Normal appearance.   Cardiovascular:      Rate and Rhythm: Normal rate and regular rhythm.      Heart sounds: Normal heart sounds. "   Pulmonary:      Effort: Pulmonary effort is normal.      Breath sounds: Normal breath sounds.   Abdominal:      General: Abdomen is flat. There is no distension.      Palpations: Abdomen is soft. There is no mass.      Tenderness: There is no abdominal tenderness. There is no right CVA tenderness, left CVA tenderness, guarding or rebound.   Neurological:      Mental Status: She is alert.            Result Review :                     Assessment and Plan    Diagnoses and all orders for this visit:    1. Generalized abdominal pain (Primary)  Assessment & Plan:  X-ray today of abdomen shows some stool throughout.  Discussed with Karen to increase MiraLAX to 1 capful twice a day and to add 1 square of Ex-Lax nightly.  We also discussed the possibility of this being a ovarian cyst.  Will set up with transabdominal pelvic ultrasound.  Discussed with Karen if continues to have abdominal pain after eating may consider gallbladder evaluation.    Orders:  -     XR Abdomen KUB (In Office)  -     Cancel: POC Urinalysis Dipstick, Automated  -     Cancel: POC Pregnancy, Urine  -     US Pelvis Complete; Future            Follow Up   Return if symptoms worsen or fail to improve.  Patient was given instructions and counseling regarding her condition or for health maintenance advice. Please see specific information pulled into the AVS if appropriate.     Patient or patient representative verbalized consent for the use of Ambient Listening during the visit with  Bijan Peralta MD for chart documentation. 8/20/2024  12:37 EDT     Bijan Peralta MD  08/20/2024    Answers submitted by the patient for this visit:  Primary Reason for Visit (Submitted on 8/19/2024)  What is the primary reason for your visit?: Abdominal Pain  Abdominal Pain Questionnaire (Submitted on 8/19/2024)  Chief Complaint: Abdominal pain  Chronicity: new  Onset: yesterday  Onset quality: sudden  Frequency: intermittently  Progression since onset: resolved  Pain  location: RLQ, suprapubic region  Pain - numeric: 9/10  Pain quality: aching, cramping, tearing  Radiates to: RLQ, suprapubic region  flatus: Yes  Aggravated by: nothing  Relieved by: nothing

## 2024-08-20 NOTE — ASSESSMENT & PLAN NOTE
X-ray today of abdomen shows some stool throughout.  Discussed with Karen to increase MiraLAX to 1 capful twice a day and to add 1 square of Ex-Lax nightly.  We also discussed the possibility of this being a ovarian cyst.  Will set up with transabdominal pelvic ultrasound.  Discussed with Karen if continues to have abdominal pain after eating may consider gallbladder evaluation.

## 2024-09-08 ENCOUNTER — HOSPITAL ENCOUNTER (OUTPATIENT)
Facility: HOSPITAL | Age: 19
Discharge: HOME | End: 2024-09-08
Admitting: PEDIATRICS
Payer: COMMERCIAL

## 2024-09-08 DIAGNOSIS — R10.84 GENERALIZED ABDOMINAL PAIN: ICD-10-CM

## 2024-09-08 PROCEDURE — 76856 US EXAM PELVIC COMPLETE: CPT

## 2024-09-10 ENCOUNTER — TELEPHONE (OUTPATIENT)
Dept: FAMILY MEDICINE CLINIC | Facility: CLINIC | Age: 19
End: 2024-09-10
Payer: COMMERCIAL

## 2025-02-05 ENCOUNTER — OFFICE VISIT (OUTPATIENT)
Dept: FAMILY MEDICINE CLINIC | Facility: CLINIC | Age: 20
End: 2025-02-05
Payer: COMMERCIAL

## 2025-02-05 VITALS
HEART RATE: 90 BPM | BODY MASS INDEX: 17.74 KG/M2 | HEIGHT: 67 IN | DIASTOLIC BLOOD PRESSURE: 62 MMHG | SYSTOLIC BLOOD PRESSURE: 118 MMHG | OXYGEN SATURATION: 99 % | WEIGHT: 113 LBS

## 2025-02-05 DIAGNOSIS — Z11.1 SCREENING-PULMONARY TB: Primary | ICD-10-CM

## 2025-02-05 DIAGNOSIS — Z23 INFLUENZA VACCINE ADMINISTERED: ICD-10-CM

## 2025-02-05 NOTE — ASSESSMENT & PLAN NOTE
Will draw QuantiFERON gold today.  Will call with results.  Negative tuberculosis risk assessment and form filled out today.

## 2025-02-05 NOTE — LETTER
[address] [place] [address]  [phone]       [facility]  IMMUNIZATION CERTIFICATE    (Required for each child enrolled in day care center, certified family  home, other licensed facility which cares for children,  programs, and public and private primary and secondary schools.)    Name of Child:  Karen Mcmahan  YOB: 2005   Name of Parent:  ______________________________  Address:  [address] [place] [address] [address] [place] [address]     VACCINE/DOSE DATE DATE DATE DATE DATE   Hepatitis B 2005 2/12/2004 4/12/2004 6/23/2004    Alt. Adult Hepatitis B¹        DTap/DTP/DT² 2/12/2004 4/12/2004 6/23/2004 6/15/2005 1/11/2008   Hib³ 2/12/2004 4/12/2004 3/14/2005     Pneumococcal  2/12/2004 4/12/2004 6/23/2004 12/13/2004    Polio 2/12/2004 4/12/2004 6/23/2004 1/11/2008    Influenza 11/1/2023 2/5/2025      MMR 3/14/2005 1/11/2008      Varicella 12/13/2004 1/11/2008      Hepatitis A 8/14/2012 2/19/2013      Meningococcal 1/4/2016 2/10/2020      Td        Tdap 1/4/2016       Rotavirus        HPV 1/4/2016 3/16/2016 7/18/2016     Men B 7/25/2022 7/25/2023      Pneumococcal (PPSV23)          ¹ Alternative two dose series of approved adult hepatitis B vaccine for adolescents 11 through 15 years of age. ² DTaP, DTP, or DT. ³ Hib not required at 5 years of age or more.    Had Chickenpox or Zoster disease: No     This child is current for immunizations until  /  /  , (14 days after the next shot is due) after which this certificate is no longer valid, and a new certificate must be obtained.   This child is not up-to-date at this time.  This certificate is valid unti  /  /  ,l  (14 days after the next shot is due) after which this certificate is no longer valid, and a new certificate must be obtained.    Reason child is not up-to-date:   Provisional Status - Child is behind on required immunizations.   Medical Exemption - The following immunizations are not medically  indicated:  ___________________                                      _______________________________________________________________________________       If Medical Exemption, can these vaccines be administered at a later date?  No:  _  Yes: _  Date: __/__/__    Adventism Objection  I CERTIFY THAT THE ABOVE NAMED CHILD HAS RECEIVED IMMUNIZATIONS AS STIPULATED ABOVE.     __________________________________________________________     Date: 2/5/2025   (Signature of physician, APRN, PA, pharmacist, LHD , RN or LPN designee)      This Certificate should be presented to the school or facility in which the child intends to enroll and should be retained by the school or facility and filed with the child's health record.

## 2025-02-05 NOTE — PROGRESS NOTES
"Chief Complaint  TB Test (Pt is here alone for tb skin test and flu shot )    Subjective          History of Present Illness  Karen Mcmahan is here today for concerns of needing a QuantiFERON gold blood test for tuberculosis screening and a flu vaccine.  History of Present Illness  The patient presents for a routine checkup.    She reports a significant improvement in her previously experienced abdominal pain.     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 She has been informed of the need for tuberculosis testing as part of her training requirements. She has been experiencing a mild cough recently but does not report any chronic symptoms such as night sweats, weight loss, or hemoptysis. She has no known exposure to individuals with tuberculosis and has not been in contact with recent immigrants or incarcerated persons.  She has previously had a negative PPD.  She has recently applied for [employer] school.      IMMUNIZATIONS  She is up to date on all her vaccinations except for the influenza vaccine, which she will receive today. Her tetanus vaccine is due in January 2026.    Answers submitted by the patient for this visit:  Problem not listed (Submitted on 2/4/2025)  Chief Complaint: Other medical problem  Reason for appointment: TB skin test  anorexia: No  joint pain: No  change in stool: No  headaches: No  joint swelling: No  vertigo: No  visual change: No      Objective   Vital Signs:   /62   Pulse 90   Ht 168.9 cm (66.5\")   Wt 51.3 kg (113 lb)   SpO2 99%   BMI 17.97 kg/m²     Body mass index is 17.97 kg/m².      Review of Systems   Constitutional:  Negative for chills, diaphoresis, fatigue and fever.   HENT:  Negative for congestion, sore throat and swollen glands.    Respiratory:  Negative for cough.    Cardiovascular:  Negative for chest pain.   Gastrointestinal:  Negative for abdominal pain, nausea and vomiting. "   Genitourinary:  Negative for dysuria.   Musculoskeletal:  Negative for myalgias and neck pain.   Skin:  Negative for rash.   Neurological:  Negative for weakness and numbness.       No current outpatient medications on file.    Allergies: Clavulanic acid, Augmentin [amoxicillin-pot clavulanate], and Penicillins    Physical Exam  Constitutional:       Appearance: Normal appearance.   Cardiovascular:      Rate and Rhythm: Normal rate and regular rhythm.      Heart sounds: Normal heart sounds.   Pulmonary:      Effort: Pulmonary effort is normal.      Breath sounds: Normal breath sounds.   Abdominal:      General: Abdomen is flat.      Palpations: Abdomen is soft.   Neurological:      Mental Status: She is alert.            Result Review :                     Assessment and Plan    Diagnoses and all orders for this visit:    1. Screening-pulmonary TB (Primary)  Assessment & Plan:  Will draw QuantiFERON gold today.  Will call with results.  Negative tuberculosis risk assessment and form filled out today.    Orders:  -     QuantiFERON TB Gold    2. Influenza vaccine administered  Assessment & Plan:  Flu vaccine given today.    Orders:  -     Fluzone >6mos            Follow Up   No follow-ups on file.  Patient was given instructions and counseling regarding her condition or for health maintenance advice. Please see specific information pulled into the AVS if appropriate.     Patient or patient representative verbalized consent for the use of Ambient Listening during the visit with  Bijan Peralta MD for chart documentation. 2/5/2025  11:22 STEPH Peralta MD  02/05/2025

## 2025-02-07 LAB
GAMMA INTERFERON BACKGROUND BLD IA-ACNC: 0.08 IU/ML
M TB IFN-G BLD-IMP: NEGATIVE
M TB IFN-G CD4+ BCKGRND COR BLD-ACNC: 0.09 IU/ML
M TB IFN-G CD4+CD8+ BCKGRND COR BLD-ACNC: 0.08 IU/ML
MITOGEN IGNF BCKGRD COR BLD-ACNC: >10 IU/ML
QUANTIFERON INCUBATION: NORMAL
SERVICE CMNT-IMP: NORMAL

## 2025-02-09 ENCOUNTER — TELEPHONE (OUTPATIENT)
Dept: FAMILY MEDICINE CLINIC | Facility: CLINIC | Age: 20
End: 2025-02-09
Payer: COMMERCIAL

## 2025-02-09 NOTE — TELEPHONE ENCOUNTER
Let know blood test came back negative for tuberculosis exposures.  See if she would like for us to print to have her , mail, or fax.